# Patient Record
(demographics unavailable — no encounter records)

---

## 2020-10-01 NOTE — NUR
0600-COVID TEST RESULTS RECEIVED POSITIVE.  DR. CLIFTON NOTIFIED.
ORDER RECEIVED TO CANCELL SURGERY.  PT. LEFT AMBULATORY WITH GUARDS
AT SIDE.